# Patient Record
Sex: FEMALE | URBAN - METROPOLITAN AREA
[De-identification: names, ages, dates, MRNs, and addresses within clinical notes are randomized per-mention and may not be internally consistent; named-entity substitution may affect disease eponyms.]

---

## 2018-07-27 ENCOUNTER — APPOINTMENT (RX ONLY)
Dept: URBAN - METROPOLITAN AREA CLINIC 299 | Facility: CLINIC | Age: 28
Setting detail: DERMATOLOGY
End: 2018-07-27

## 2018-07-27 VITALS — WEIGHT: 160 LBS | HEIGHT: 69 IN

## 2018-07-27 DIAGNOSIS — L65.9 NONSCARRING HAIR LOSS, UNSPECIFIED: ICD-10-CM

## 2018-07-27 PROBLEM — E03.9 HYPOTHYROIDISM, UNSPECIFIED: Status: ACTIVE | Noted: 2018-07-27

## 2018-07-27 PROCEDURE — 99202 OFFICE O/P NEW SF 15 MIN: CPT

## 2018-07-27 PROCEDURE — ? ORDER TESTS

## 2018-07-27 PROCEDURE — ? PATIENT SPECIFIC COUNSELING

## 2018-07-27 PROCEDURE — ? COUNSELING

## 2018-07-27 ASSESSMENT — LOCATION SIMPLE DESCRIPTION DERM: LOCATION SIMPLE: SCALP

## 2018-07-27 ASSESSMENT — LOCATION ZONE DERM: LOCATION ZONE: SCALP

## 2018-07-27 ASSESSMENT — LOCATION DETAILED DESCRIPTION DERM: LOCATION DETAILED: LEFT SUPERIOR PARIETAL SCALP

## 2018-07-27 NOTE — PROCEDURE: PATIENT SPECIFIC COUNSELING
[facility] recently diagnosed with hypothyroidism, not yet well controlled. She has also had a lot of stress recently as she is finishing her masters degree and is getting  Oct 2018. Currently trying biotin supplements. Will consider rogaine and Spironolactone, pending bloodwork results, but she does state she is going to try to get pregnant right after she gets  [facility] recently diagnosed with hypothyroidism, not yet well controlled. She has also had a lot of stress recently as she is finishing her masters degree and is getting  Oct 2018. Currently trying biotin supplements. Will consider rogaine and Spironolactone, pending bloodwork results, but she does state she is going to try to get pregnant right after she gets